# Patient Record
Sex: MALE | Race: BLACK OR AFRICAN AMERICAN | Employment: UNEMPLOYED | URBAN - METROPOLITAN AREA
[De-identification: names, ages, dates, MRNs, and addresses within clinical notes are randomized per-mention and may not be internally consistent; named-entity substitution may affect disease eponyms.]

---

## 2024-10-25 ENCOUNTER — HOSPITAL ENCOUNTER (EMERGENCY)
Age: 39
Discharge: HOME | End: 2024-10-25

## 2024-10-25 VITALS
WEIGHT: 283 LBS | DIASTOLIC BLOOD PRESSURE: 91 MMHG | TEMPERATURE: 98.6 F | BODY MASS INDEX: 47.15 KG/M2 | RESPIRATION RATE: 16 BRPM | OXYGEN SATURATION: 97 % | HEIGHT: 65 IN | SYSTOLIC BLOOD PRESSURE: 134 MMHG | HEART RATE: 66 BPM

## 2024-10-25 DIAGNOSIS — L03.012 PARONYCHIA OF FINGER OF LEFT HAND: Primary | ICD-10-CM

## 2024-10-25 PROCEDURE — 6370000000 HC RX 637 (ALT 250 FOR IP): Performed by: PHYSICIAN ASSISTANT

## 2024-10-25 PROCEDURE — 99283 EMERGENCY DEPT VISIT LOW MDM: CPT

## 2024-10-25 PROCEDURE — 10060 I&D ABSCESS SIMPLE/SINGLE: CPT

## 2024-10-25 RX ORDER — IBUPROFEN 800 MG/1
800 TABLET, FILM COATED ORAL ONCE
Status: DISCONTINUED | OUTPATIENT
Start: 2024-10-25 | End: 2024-10-25 | Stop reason: HOSPADM

## 2024-10-25 RX ADMIN — AMOXICILLIN AND CLAVULANATE POTASSIUM 1 TABLET: 875; 125 TABLET, FILM COATED ORAL at 11:21

## 2024-10-25 ASSESSMENT — ENCOUNTER SYMPTOMS
SHORTNESS OF BREATH: 0
ABDOMINAL PAIN: 0
VOMITING: 0
DIARRHEA: 0
RHINORRHEA: 0
COUGH: 0
NAUSEA: 0
COLOR CHANGE: 1
WHEEZING: 0

## 2024-10-25 ASSESSMENT — PAIN SCALES - GENERAL: PAINLEVEL_OUTOF10: 0

## 2024-10-25 ASSESSMENT — PAIN - FUNCTIONAL ASSESSMENT: PAIN_FUNCTIONAL_ASSESSMENT: 0-10

## 2024-10-25 NOTE — ED PROVIDER NOTES
interpreted by the Emergency Department Physician in the absence of a cardiologist.  Please see their note for interpretation of EKG.    RADIOLOGY:   Non-plain film images such as CT, Ultrasound and MRI are read by the radiologist. Plain radiographic images are visualized and preliminarily interpreted by the ED Provider with the below findings:      Interpretation per the Radiologist below, if available at the time of this note:    No orders to display     No results found.    No results found.    PROCEDURES   Unless otherwise noted below, none     Procedures  Incision and Drainage Procedure Note    Indication: paronychia     Procedure: The patient was positioned appropriately and the skin over the incision site was prepped with alcohol. Local anesthesia was not performed at the patient's request.  An incision was then made over the greatest area of fluctuance and approximately 3 cc of thick, foul smelling, and purulent material was expressed. Loculations were not present. The drainage cavity was then dressed with a bandage.     The patient tolerated the procedure well.    Complications: None      CRITICAL CARE TIME (.cctime)       PAST MEDICAL HISTORY      has no past medical history on file.     EMERGENCY DEPARTMENT COURSE and DIFFERENTIAL DIAGNOSIS/MDM:   Vitals:    Vitals:    10/25/24 1056   BP: (!) 134/91   Pulse: 66   Resp: 16   Temp: 98.6 °F (37 °C)   TempSrc: Oral   SpO2: 97%   Weight: 128.4 kg (283 lb)   Height: 1.651 m (5' 5\")       Patient was given the following medications:  Medications   ibuprofen (ADVIL;MOTRIN) tablet 800 mg (has no administration in time range)   amoxicillin-clavulanate (AUGMENTIN) 875-125 MG per tablet 1 tablet (1 tablet Oral Given 10/25/24 1121)             Is this patient to be included in the SEP-1 Core Measure due to severe sepsis or septic shock?   No   Exclusion criteria - the patient is NOT to be included for SEP-1 Core Measure due to:  2+ SIRS criteria are not met    Chronic  Conditions affecting care:    has no past medical history on file.    CONSULTS: (Who and What was discussed)  None      Social Determinants Significantly Affecting Health : None    Records Reviewed (External and Source) n/a    CC/HPI Summary, DDx, ED Course, and Reassessment: Patient presents for evaluation of pain and swelling of his left third digit.  On exam, he is resting comfortably in bed no acute distress and nontoxic.  Vitals are within normal limits and he is afebrile.  Patient does have pretty significant swelling of the entire left third distal phalanx with purulence to the ulnar side of nailbed and cuticle.  There is no active drainage.  He is neurovascularly intact distally.  Area was soaked and then subsequently incised and drained per procedure note.  He tolerated without difficulty.  Started empirically on Augmentin.  Additional symptomatic, supportive and wound care discussed.    Disposition Considerations (tests considered but not done, Admit vs D/C, Shared Decision Making, Pt Expectation of Test or Tx.): The patient is otherwise not clinically toxic nor febrile. Differential diagnosis includes but is not limited to erysipelas, osteomyelitis, septic arthritis, lymphadenitis, DVT and necrotizing fasciitis. The appearance of the infection is such that I believe he will improve with oral antibiotics.  He was advised to follow-up with their family doctor within the next 24-48 hours and return to the ED if there is no improvement or if the infection starts to worsen in any way.  Stable for discharge.        I am the Primary Clinician of Record.  FINAL IMPRESSION      1. Paronychia of finger of left hand          DISPOSITION/PLAN     DISPOSITION Decision To Discharge 10/25/2024 11:15:14 AM           PATIENT REFERRED TO:  [facility] Emergency Department  [address] [address]  [phone]  Go to   If symptoms worsen      DISCHARGE MEDICATIONS:  Discharge Medication List as of